# Patient Record
Sex: FEMALE | Race: WHITE | URBAN - NONMETROPOLITAN AREA
[De-identification: names, ages, dates, MRNs, and addresses within clinical notes are randomized per-mention and may not be internally consistent; named-entity substitution may affect disease eponyms.]

---

## 2018-11-27 ENCOUNTER — APPOINTMENT (RX ONLY)
Dept: URBAN - NONMETROPOLITAN AREA CLINIC 13 | Facility: CLINIC | Age: 48
Setting detail: DERMATOLOGY
End: 2018-11-27

## 2018-11-27 DIAGNOSIS — Z41.9 ENCOUNTER FOR PROCEDURE FOR PURPOSES OTHER THAN REMEDYING HEALTH STATE, UNSPECIFIED: ICD-10-CM

## 2018-11-27 PROCEDURE — ? BOTOX

## 2018-11-27 NOTE — PROCEDURE: BOTOX
[place] Units: 0
Expiration Date (Month Year): 04/21
Price (Use Numbers Only, No Special Characters Or $): 255
Glabellar Complex Units: 25
Lot #: 
Additional Area 4 Location: brow
Additional Area 1 Location: [place], L
Detail Level: Zone
Additional Area 5 Location: lip
Additional Area 2 Location: [place],
Post-Care Instructions: Patient instructed to not lie down for 4 hours and limit physical activity for 24 hours. Patient instructed not to travel by airplane for 48 hours.
Consent: Written consent obtained. Risks include but not limited to lid/brow ptosis, bruising, swelling, diplopia, temporary effect, incomplete chemical denervation.
Dilution (U/0.1 Cc): 1
Additional Area 4 Units: 5
Additional Area 3 Location: [place]unnies

## 2020-07-02 ENCOUNTER — APPOINTMENT (RX ONLY)
Dept: URBAN - NONMETROPOLITAN AREA CLINIC 13 | Facility: CLINIC | Age: 50
Setting detail: DERMATOLOGY
End: 2020-07-02

## 2020-07-02 DIAGNOSIS — Z41.9 ENCOUNTER FOR PROCEDURE FOR PURPOSES OTHER THAN REMEDYING HEALTH STATE, UNSPECIFIED: ICD-10-CM

## 2020-07-02 PROCEDURE — ? BOTOX

## 2020-07-02 NOTE — PROCEDURE: BOTOX
[place] Units: 0
Show Periorbital Units: Yes
Glabellar Complex Units: 25
Consent: Written consent obtained. Risks include but not limited to lid/brow ptosis, bruising, swelling, diplopia, temporary effect, incomplete chemical denervation.
Show Right And Left Pupillary Line Units: No
Additional Area 1 Location: [facility]
Additional Area 4 Location: brow
Lot #: 
Additional Area 3 Location: R side forehead
Expiration Date (Month Year): 1/23
Detail Level: Zone
Additional Area 6 Location: lip upper
Additional Area 4 Units: 5
Post-Care Instructions: Patient instructed to not lie down for 4 hours and limit physical activity for 24 hours. Patient instructed not to travel by airplane for 48 hours.
Dilution (U/0.1 Cc): 1
Additional Area 6 Units: 4
Additional Area 2 Location: chin
Additional Area 5 Location: [place]
Price (Use Numbers Only, No Special Characters Or $): 407
Forehead Units: 3

## 2020-07-16 ENCOUNTER — APPOINTMENT (RX ONLY)
Dept: URBAN - NONMETROPOLITAN AREA CLINIC 13 | Facility: CLINIC | Age: 50
Setting detail: DERMATOLOGY
End: 2020-07-16

## 2020-07-16 DIAGNOSIS — Z41.9 ENCOUNTER FOR PROCEDURE FOR PURPOSES OTHER THAN REMEDYING HEALTH STATE, UNSPECIFIED: ICD-10-CM

## 2020-07-16 PROCEDURE — ? BOTOX

## 2020-07-16 NOTE — PROCEDURE: BOTOX
Expiration Date (Month Year): 8/22
Show Depressor Anguli Units: Yes
Additional Area 4 Location: brow
Show Ucl Units: No
[facility] Units: 0
Lot #: 
Additional Area 3 Location: R side forehead
Post-Care Instructions: Patient instructed to not lie down for 4 hours and limit physical activity for 24 hours. Patient instructed not to travel by airplane for 48 hours.
Additional Area 6 Location: lip upper
Dilution (U/0.1 Cc): 1
Detail Level: Zone
Additional Area 2 Location: chin
Additional Area 5 Location: [place]hen lines
Additional Area 4 Units: 5
Consent: Written consent obtained. Risks include but not limited to lid/brow ptosis, bruising, swelling, diplopia, temporary effect, incomplete chemical denervation.

## 2020-12-22 ENCOUNTER — APPOINTMENT (RX ONLY)
Dept: URBAN - NONMETROPOLITAN AREA CLINIC 13 | Facility: CLINIC | Age: 50
Setting detail: DERMATOLOGY
End: 2020-12-22

## 2020-12-22 DIAGNOSIS — Z41.9 ENCOUNTER FOR PROCEDURE FOR PURPOSES OTHER THAN REMEDYING HEALTH STATE, UNSPECIFIED: ICD-10-CM

## 2020-12-22 PROCEDURE — ? BOTOX

## 2020-12-22 NOTE — PROCEDURE: BOTOX
Depressor Anguli Oris Units: 0
Consent: Written consent obtained. Risks include but not limited to lid/brow ptosis, bruising, swelling, diplopia, temporary effect, incomplete chemical denervation.
Show Right And Left Periorbital Units: No
Additional Area 4 Location: under eyes
Show Additional Area 1: Yes
Detail Level: Zone
Additional Area 3 Units: 5
Dilution (U/0.1 Cc): 1
Post-Care Instructions: Patient instructed to not lie down for 4 hours and limit physical activity for 24 hours. Patient instructed not to travel by airplane for 48 hours.
Price (Use Numbers Only, No Special Characters Or $): 892
Additional Area 3 Location: brow
Additional Area 2 Location: upper lip
Lot #: 
Additional Area 1 Location: [facility]
Glabellar Complex Units: 25
Additional Area 5 Location: chin
Expiration Date (Month Year): 09/23
Forehead Units: 3
Additional Area 6 Location: lateral low on FH for fine rhytides. She gets Botox in [place] and FH by physician for migranes